# Patient Record
Sex: FEMALE
[De-identification: names, ages, dates, MRNs, and addresses within clinical notes are randomized per-mention and may not be internally consistent; named-entity substitution may affect disease eponyms.]

---

## 2018-06-26 NOTE — USB
Reason for exam: clinical finding.



History:

Implant Removal of both breasts, February 27, 2018.  Saline implants in both 

breasts, 2016.  Silicone gel implants in both breasts, 1990.

Indicated problem(s): palpable abnormality, breast implant problem, lump or 

thickening, and large axillary lymph nodes in both breasts.  Pain in the left 

breast.



Physical Findings:

Nurse Summary: area thick, multiple prominent nodular lymph nodes (nurse dw)>



US Breast LT

Left complete breast ultrasound includes all four quadrants, the retroareolar 

region and axilla. Finding demonstrates a 1.0 x 1.1 x 0.5cm oval, solid, 

hypoechoic, vascular lesion at 2 o'clock not previously identified, a 0.7 x 0.6 x 

0.3cm oval, solid, hypoechoic lesion at 3 o'clock not previously identified and a 

1.6 x 1.6 x 1.7cm round, cystic lesion at the axilla stable from 11/30/17, largest

of multiple cystic areas.



Right limited breast ultrasound including focal area of concern, retroareolar and 

axilla demonstrates a 3.4cm hyperechoic probable silicone at 2 o'clock.



These results were verbally communicated with the patient on 6/26/18.





ASSESSMENT: Suspicious, BI-RAD 4



RECOMMENDATION:

Surgical consultation of the left breast.

Manage patient on a clinical basis.



Called with mammographic findings and has scheduled an appointment for the patient

for 7/5/18 at 8:00 with Dr. Muse.



PRELIMINARY REPORT CALLED AND FAXED TO DR. MUSE ON 6/26/18.

## 2018-07-20 ENCOUNTER — HOSPITAL ENCOUNTER (OUTPATIENT)
Dept: HOSPITAL 47 - WWCWWP | Age: 58
End: 2018-07-20
Payer: COMMERCIAL

## 2018-07-20 VITALS — SYSTOLIC BLOOD PRESSURE: 130 MMHG | DIASTOLIC BLOOD PRESSURE: 75 MMHG | HEART RATE: 50 BPM | TEMPERATURE: 97.5 F

## 2018-07-20 VITALS — BODY MASS INDEX: 19.1 KG/M2

## 2018-07-20 DIAGNOSIS — Z53.9: Primary | ICD-10-CM

## 2018-07-20 NOTE — P.GSHP
History of Present Illness


H&P Date: 07/20/18





 Patient is a 58 year old white female who had bilateral silicone breast 

implants placed in 1990.  She did well for many years and then in approximately 

2007 she did a back flip on a trampoline and noted that the implant in the left 

breast ruptured.  She has been followed by a general/plastic surgeon in [place] 

and wishes to return to her for any treatment.  She recently noticed swelling 

and erythema in her left axilla and was concerned that this may be an emergent 

situation.  She underwent a surgical procedure in February 2018 at which time 

left axillary lymph node and scar tissue was debulked.  Operative report is 

reviewed although pathology is not available.  The patient came back to 

[place] and noted that she had some erythema and swelling in the left axilla 

she was seen by her primary care doctor and started on antibiotics.  She also 

had an ultrasound performed 6818.  The ultrasound of the left breast revealed 

a 1.1 cm solid vascular lesion at 2:00 not previously seen, a 0.7 cm lesion at 3

:00 not previously seen, and a 1.6 cm cystic lesion in the axilla.  In the 

right breast there was a 3.4 cm hypoechoic probable silicone area noted at 2:

00.  The patient was not willing to give any further history and continues to 

state that she wanted to go back to [place]. 


 


Physical examination: Examination of the left breast and axilla was done in a 

limited fashion as the patient wanted to be sure that there was no acute 

process ongoing


No acute infection was identified


Nodularity was noted in the left axillary area


Scar is noted related to prior breast surgery


Impression/plan:


1.  58-year-old white female with ultrasound abnormality noted in the left 

breast


2.  Extensive history related to prior left breast ruptured implant


3.  Patient wishes to return to [place] for treatment related to the left breast


At this time the patient does not appear to have an acute inflammatory or 

infectious process in the left axilla or left breast.  She does have a 

radiographic abnormality on an ultrasound performed 6818  which is being 

reviewed with radiology it was considered a BIRADS 4 lesion, for which biopsy 

may be recommended.  She understands this.   At this time she states that she 

does not want any further intervention here and wishes to return to [place].


cc: Dr. Muse, Dr. Cindy Salcido [facility], 

[place], [place], [address]








Past Medical History


Past Medical History: Thyroid Disorder


History of Any Multi-Drug Resistant Organisms: None Reported


Past Surgical History: Breast Surgery


Additional Past Surgical History / Comment(s): silicone implants in the 90's, 

patient stated that she's had multiple breast surgeries


Smoking Status: Former smoker


Additional Past Alcohol Use History / Comment(s): quit smoking 2006





Medications and Allergies


 Home Medications











 Medication  Instructions  Recorded  Confirmed  Type


 


Cholecalciferol [Vitamin D3] 1,000 unit PO DAILY 07/20/18 07/20/18 History


 


Levothyroxine Sodium 25 mcg PO QAM 07/20/18 07/20/18 History


 


Multivitamins, Thera [Multivitamin 1 tab PO DAILY 07/20/18 07/20/18 History





(formulary)]    


 


Zolpidem [Ambien] 10 mg PO HS PRN 07/20/18 07/20/18 History











 Allergies











Allergy/AdvReac Type Severity Reaction Status Date / Time


 


No Known Allergies Allergy   Unverified 07/20/18 12:02














Surgical - Exam


 Vital Signs











Temp Pulse BP Pulse Ox


 


 97.5 F L  50 L  130/75   98 


 


 07/20/18 11:52  07/20/18 11:52  07/20/18 11:52  07/20/18 11:52

## 2018-07-20 NOTE — P.PN
Progress Note - Text


Progress Note Date: 07/20/18





Patient's ultrasound was reviewed with DR. Gonzalez from radiology.  The 

recommendation is that the patient receive a mammogram and then at minimum a 

core biopsy of the lesion at 2:00 in the left breast.  This information will be 

relayed to the patient and Dr. Sosa.


cc: Dr. Sosa

## 2018-07-20 NOTE — P.PN
Progress Note - Text


Progress Note Date: 07/20/18





Information reguarding the patient's need for a mammogram and recommendation of 

ultrasound core biopsy was discussed with Dr. Muse.  Dr. Muse relates 

that the patient had an mammogram performed in [place] in August of last year, 

however we do not have that report.  We have attempted to contact the patient 

and will continue to do so.

## 2018-07-26 NOTE — P.PN
Progress Note - Text


Progress Note Date: 07/26/18





Dear Kenisha,


We understand the you have decided to transfer your  breast care to [place].  

We also understand that you have been provided with your medical records.  

Please understand that with your recent ultrasound abnormalities it has been 

recommended that a biopsy be considered.  If we can be of any further 

assistance please do not hesitate to contact us.


    Best regards, Dr. Jenna Sifuentes


Cc: Dr. Muse

## 2018-08-16 ENCOUNTER — HOSPITAL ENCOUNTER (OUTPATIENT)
Dept: HOSPITAL 47 - RADMAMWWP | Age: 58
Discharge: HOME | End: 2018-08-16
Payer: COMMERCIAL

## 2018-08-16 DIAGNOSIS — N64.4: ICD-10-CM

## 2018-08-16 DIAGNOSIS — N63.20: Primary | ICD-10-CM

## 2018-08-16 DIAGNOSIS — R92.8: ICD-10-CM

## 2018-08-16 PROCEDURE — 77065 DX MAMMO INCL CAD UNI: CPT

## 2018-08-16 NOTE — USB
Reason for exam: additional evaluation requested from abnormal screening.



History:

Patient is postmenopausal.

Implant Removal of both breasts, February 27, 2018.  Saline implants in both 

breasts, 2016.  Silicone gel implants in both breasts, 1990.



US Breast Limited LT

Left limited breast ultrasound including focal area of concern, retroareolar and 

axilla demonstrates a 1.0 x 0.4 x 1.2 cm oval solid lesion at 2 o'clock, a 0.7 x 

0.3 x 0.8 cm oval solid lesion at 3 oclock, which a 6 month follow up ultrasound 

is recommended.  A 1.7 x 1.6 cm round cystic lesion in the axilla that is thought 

to be an abnormal node. A 0.9 x 0.9 x 0.9 cm cystic lesion at 10 o'clock and a 

second 0.8 x 0.7 x 0.7 cm cystic lesion at 10 o'clock that could be silicone and a

6 month follow up  ultrasound is recommended for these.



These results were verbally communicated with the patient and result sheet given 

to the patient on 8/16/18.





ASSESSMENT: Suspicious, BI-RAD 4



RECOMMENDATION:

Ultrasound core biopsy of the left breast.





Called Dr. Sosa with mammographic findings and has scheduled an appointment for 

the patient for 8/24/18 at 9:20 with Dr Kyle.



PRELIMINARY REPORT CALLED AND FAXED TO DR. KYLE ON 8/16/18.

## 2018-08-16 NOTE — MM
Reason for exam: additional evaluation requested from prior study.



History:

Patient is postmenopausal.

Implant Removal of both breasts, February 27, 2018.  Saline implants in both 

breasts, 2016.  Silicone gel implants in both breasts, 1990.



Physical Findings:

Nurse Summary: A 0.5 cm lesion in the right axilla.



MG Diag Mamm Implant LT w CAD

CC and MLO view(s) were taken of the left breast.

Prior study comparison: June 8, 2018, left breast US breast LT.  November 30, 2017, left breast ultrasound, performed at [place].

The breast tissue is extremely dense which could obscure a lesion on mammography.

There is a persistant Left upper inner quadrant mass with associated 

calcifications.  Implant displaced views could not be performed due to patient 

pain.





ASSESSMENT: Incomplete: need additional imaging evaluation, BI-RAD 0



RECOMMENDATION:

Ultrasound of the left breast.

## 2018-08-24 ENCOUNTER — HOSPITAL ENCOUNTER (OUTPATIENT)
Dept: HOSPITAL 47 - WWCWWP | Age: 58
Discharge: HOME | End: 2018-08-24
Attending: SURGERY
Payer: COMMERCIAL

## 2018-08-24 VITALS
TEMPERATURE: 97.3 F | RESPIRATION RATE: 12 BRPM | SYSTOLIC BLOOD PRESSURE: 120 MMHG | DIASTOLIC BLOOD PRESSURE: 75 MMHG | HEART RATE: 51 BPM

## 2018-08-24 VITALS — BODY MASS INDEX: 19.6 KG/M2

## 2018-08-24 DIAGNOSIS — Z53.9: Primary | ICD-10-CM

## 2018-08-24 NOTE — P.GSHP
History of Present Illness


H&P Date: 08/24/18





Patient is a 58 year old white female status post 1991 had bilateral silicone 

breast implants.  In 2006 she jumped on a trampoline, she did a back flip and 

landed on the left breast implant and feels like it ruptured at that point in 

time.  She subsequently saw a Dr. in [place], Dr. Green, a plastic surgeon who 

left the right implant in place but removed the left implant without replacing 

it and the patient had a drain for 3 months and multiple areas of silicone that 

were removed. She than had an implant exchange in 2007 on the left, and 

replaced the right side also. She than had some further cosmetic surgery done 

and was referred to a reconstructive surgeon.  She saw Dr. Kellogg, she had a 

bilateral implant exchange again in 2013.  She again had complaints and was 

seen by Dr. Mitchell in [place].  She had the implants exchanged for a fourth time

, in 2016.  The patient had a bilateral mammogram in [place] in November 2017.  

At that time multiple foci of what appeared to be free silicone in silicone 

granulomas were noted in the afferent left axilla.  No no was made of concern 

about the right breast.  She will be due for a right breast mammogram in 

November 2018.  In February of 2018 she noted pain in the left axilla, and had 

surgery a node removed as well as silicone, and scar tissue.  Pathology was 

negative for cancer.


   She most recently was concerned about infection in the left axilla.  At this 

time the patient states that she was started on antibiotics and that the 

swelling and redness have resolved and she is doing well.  At the time however 

she did have an ultrasound of the left breast as well as a left breast 

mammogram at our institution.  The left breast mammogram recommended an 

ultrasound and the ultrasound revealed a 0.2 cm solid lesion at 2:00, a 0.7 cm 

lesion at 3:00 and a six-month follow-up ultrasound was recommended.  

Additionally a 1.7 cm lesion was noted in the axilla that was thought to be an 

abnormal node.  A 0.9 cystic lesion was seen at 10:00 and a second 0.8 cm 

cystic lesion at 10:00 which is thought to be silicone and six-month follow-up 

was recommended for these.  The area of greatest concern was the axillary 

lesion and ultrasound core biopsy/aspiration of the left breast axillary lesion 

was recommended after reviewing the radiographs with Dr. Delatorre.  The patient 

states that the area of concern in the left axilla has decreased in size and is 

not bothering her at this time.  It is still noticeable to her but much 

diminished.


The patient drinks coffee approximately 2 cups per day, is not drink soda.  She 

does not eat much chocolate.  She is not a smoker.





Family History:


1. father: pancreatic





Hormonal:


menarche: 11


pregnancy: none, tubes blocked


menopause: late 40's


BCP: none


hormones: none





Past Surgical History:


1. rhinoplasty


2.  multiple breast surgries


3. bone shaved on her legs right leg immobilize for three months





Past Medical History:


1. hypothyroid





Social:


smoke: none


Alcohol: Negative


Drugs: Negative




















- Constitutional


Constitutional: Denies chills, Denies fever





- EENT


Eyes: denies blurred vision, denies pain


Ears: deny: decreased hearing, tinnitus


Ears, nose, mouth and throat: Denies headache, Denies sore throat





- Breasts


Breasts: bilateral: as per HPI





- Cardiovascular


Cardiovascular: Denies chest pain, Denies shortness of breath





- Respiratory


Respiratory: Denies cough, Denies 7





- Gastrointestinal


Gastrointestinal: Denies abdominal pain, Denies diarrhea, Denies nausea, Denies 

vomiting





- Genitourinary (Female)


Genitourinary: Denies dysuria, Denies hematuria





- Menstruation


Menstruation: Reports postmenopausal





- Musculoskeletal


Musculoskeletal: Denies myalgias





- Integumentary


Integumentary: Denies pruritus, Denies rash





- Neurological


Neurological: Denies numbness, Denies weakness





- Psychiatric


Psychiatric: Denies anxiety, Denies depression





- Endocrine


Comment: 





hypothyroid





- Hematologic/Lymphatic


Comment: 





none





- Allergic/Immunologic


Comment: 





none





Past Medical History


Past Medical History: Thyroid Disorder


History of Any Multi-Drug Resistant Organisms: None Reported


Past Surgical History: Breast Surgery


Additional Past Surgical History / Comment(s): silicone implants in the 90's, 

patient stated that she's had multiple breast surgeries


Smoking Status: Former smoker


Additional Past Alcohol Use History / Comment(s): quit smoking 2006





Medications and Allergies


 Home Medications











 Medication  Instructions  Recorded  Confirmed  Type


 


Cholecalciferol [Vitamin D3] 1,000 unit PO DAILY 07/20/18 07/20/18 History


 


Levothyroxine Sodium 25 mcg PO QAM 07/20/18 07/20/18 History


 


Multivitamins, Thera [Multivitamin 1 tab PO DAILY 07/20/18 07/20/18 History





(formulary)]    


 


Zolpidem [Ambien] 10 mg PO HS PRN 07/20/18 07/20/18 History











 Allergies











Allergy/AdvReac Type Severity Reaction Status Date / Time


 


No Known Allergies Allergy   Unverified 08/21/18 10:01














Surgical - Exam





- General


well developed, well nourished, no distress





- Eyes


normal ocular movement, no icteric





- ENT


no hearing loss, no congestion





- Neck


no masses, trachea midline





- Respiratory


normal respiratory effort, clear to auscultation





- Cardiovascular


Rhythm: regular


Heart Sounds: normal: S1, S2





- Abdomen


Abdomen: soft, non tender, no guarding, no rigid, no rebound





- Neurologic


no disoriented, no combative





- Musculoskeletal


normal gait, normal posture





- Psychiatric


oriented to time, oriented to person, oriented to place, speech is normal, 

memory intact





Breast examination:


Right breast: Multiple scars from prior breast implant placement multiple 

positional exam no dominant masses or nodules of concern


Right axilla: No adenopathy of concern


Left breast: Multiple scars from prior surgeries some increased area of 

nodularity at approximately the 9:00 axillary position which is in close 

proximity facility to the implant


Left axilla: Questionable shotty adenopathy of concern no enlarged nodes which 

are worrisome





Results





Mammogram and ultrasound reviewed





Assessment and Plan


Assessment: 





Impression/plan:


1.  Multiple prior breast biopsies related to implant placement


2.  Ruptured implant left breast which has been removed and replaced; patient 

has had silicone granulomas removed from the left breast area


3.  Hypothyroidism


4.  abnormal ultrasound of the left breast


5. palpable area of the left lateral axillary area may correspond to that which 

is seen on ultrasound


Plan:


1.  Ultrasound-guided aspiration/biopsy of lesion of concern in the left axilla


2.  Confirm that the area that is palpable is consistent with that which is 

seen on ultrasound if not this may require needle biopsy as well


3.  Medical management of medical problems


We have had a long discussion regarding the risks and benefits of needle biopsy/

aspiration of the area of concern on the ultrasound as well as that which is 

palpable.  The patient understands the risks and benefits.  She has reviewed 

the ultrasound with us with Dr. Delatorre of radiology.  At this time we have 

discussed options of conservative management and waiting versus options of 

needle biopsy and she wishes to proceed with needle biopsy.  We will use 

extreme caution to assure that the implants are not damaged, however we have 

discussed this risk as well.  The patient understands the risks and benefits 

this will be scheduled in the near future.


Cc: Dr. Muse

## 2018-09-10 ENCOUNTER — HOSPITAL ENCOUNTER (OUTPATIENT)
Dept: HOSPITAL 47 - RADPROMAIN | Age: 58
Discharge: HOME | End: 2018-09-10
Payer: COMMERCIAL

## 2018-09-10 VITALS — RESPIRATION RATE: 16 BRPM | TEMPERATURE: 97.4 F

## 2018-09-10 VITALS — DIASTOLIC BLOOD PRESSURE: 72 MMHG | SYSTOLIC BLOOD PRESSURE: 138 MMHG | HEART RATE: 68 BPM

## 2018-09-10 DIAGNOSIS — M79.89: Primary | ICD-10-CM

## 2018-09-10 PROCEDURE — 76942 ECHO GUIDE FOR BIOPSY: CPT

## 2018-09-10 PROCEDURE — 88305 TISSUE EXAM BY PATHOLOGIST: CPT

## 2018-09-10 PROCEDURE — 20206 BIOPSY MUSCLE PERQ NEEDLE: CPT

## 2018-09-10 NOTE — US
EXAMINATION TYPE: US biopsy soft tissue/muscle, fine-needle aspiration

 

DATE OF EXAM: 9/10/2018

 

HISTORY: Left axillary mass.

 

FINDINGS: Maximal barrier technique was utilized.  The skin overlying a 
suitable path to the patient's mass was localized with ultrasound and the 
overlying skin prepped and draped.  Ultrasound was utilized with sterile 
technique. Lidocaine was  used for local anesthesia. 21-gauge needle was 
advanced under ultrasound guidance, minimal clear, gel-like material was 
aspirated. A skin nick was made with a scalpel.  An 18-gauge needle was 
advanced under direct ultrasound guidance and core specimen obtained of the 
mass.  Specimen submitted in formalin to Pathology.  Following the procedure, 
hemostasis achieved and the patient is discharged in stable condition without 
complication.

 

IMPRESSION:STATUS POST ULTRASOUND GUIDED CORE BIOPSY OF left axillary MASS, 
PATHOLOGY IS PENDING.  THIS PROCEDURE IS PERFORMED BY THE UNDERSIGNED.   

 

Pathology Results: Benign



LEFT AXILLA, CORE BIOPSY: Benign fibroadipose tissue with fat necrosis. See 
note. 



Recommendation

Follow up ultrasound in 6 months.
LICHA

## 2018-10-19 ENCOUNTER — HOSPITAL ENCOUNTER (OUTPATIENT)
Dept: HOSPITAL 47 - WWCWWP | Age: 58
Discharge: HOME | End: 2018-10-19
Attending: SURGERY
Payer: COMMERCIAL

## 2018-10-19 VITALS
DIASTOLIC BLOOD PRESSURE: 73 MMHG | HEART RATE: 56 BPM | TEMPERATURE: 97.8 F | SYSTOLIC BLOOD PRESSURE: 123 MMHG | RESPIRATION RATE: 14 BRPM

## 2018-10-19 VITALS — BODY MASS INDEX: 19.1 KG/M2

## 2018-10-19 DIAGNOSIS — Z53.9: Primary | ICD-10-CM

## 2018-10-19 NOTE — P.PN
Subjective


Progress Note Date: 10/19/18


Principal diagnosis: 





Patient is status post ultrasound-guided core biopsy of left axillary palpable/

radiographic abnormality at approximately the 3-4 o'clock position.  The 

pathologic findings were consistent with benign fibroadipose adipose tissue 

with fat necrosis.  The biopsy was felt to be scanty and may not completely.  

Representative of the lesions seen and clinical/imaging study of correlation 

was recommended.  Her radiographs were reviewed with the radiologist with the 

pathology findings as well.  The area of concern is noted to be stable over her 

radiographs done in 2017 and with this in mind it was felt that this could be 

managed conservatively with repeat left breast mammogram and ultrasound in 6 

months.  Of some concern is the fact that there is some palpable change in the 

lateral aspect of the breast tissue/axillary area.  It is believed that this is 

the area which was sampled however I discussed with the patient that I cannot 

with certainty confirm this.  The patient states that the area of nodularity 

has been present for approximately 6 months without change.  The patient has 

been given the option of an open biopsy of this area however at this time 

wishes to return to [place] where her plastic surgeon is as she wishes to have 

evaluation with her.  The patient is not complaining of any pain in her breast 

at this time.  The patient is not complaining of any evidence of any infection 

in her breast.





Objective





- Vital Signs


Vital signs: 


 Vital Signs











Temp  97.8 F   10/19/18 08:45


 


Pulse  56 L  10/19/18 08:45


 


Resp  14   10/19/18 08:45


 


BP  123/73   10/19/18 08:45


 


Pulse Ox  99   10/19/18 08:45








 Intake & Output











 10/18/18 10/19/18 10/19/18





 18:59 06:59 18:59


 


Weight   48.988 kg














- Constitutional


General appearance: Present: thin





- EENT


Eyes: Present: EOMI


ENT: Present: hearing grossly normal





- Respiratory


Respiratory: bilateral: CTA





- Cardiovascular


Rhythm: regular


Heart sounds: normal: S1, S2





- Gastrointestinal


General gastrointestinal: Present: soft





- Musculoskeletal


Musculoskeletal: Present: gait normal





- Psychiatric


Psychiatric: Present: A&O x's 3, appropriate affect, intact judgment & insight





- Additional findings


Additional findings: 





Left breast examination: The patient left breast examination has well-healed 

scars from prior implant placement and replacement


No dominant masses or nodules of concern in the left breast


In the 3 o'clock position the patient has an area of slight nodularity which is 

approximately 6-8 mm in size


This is the area which is believed to have been biopsied by the radiologist


No adenopathy of concern in the axilla





Assessment and Plan


Assessment: 





Impression:


1.  Patient status post ultrasound core biopsy of the left axillary region/

benign fibroadipose tissue with fat necrosis


2.  Small palpable abnormality left axillary area believed to be consistent 

with the area which was biopsied this is approximately 8 mm in size


3.  Multiple prior breast biopsies related to implant placement


4.  Ruptured implant left breast which has been removed and replaced patient 

has had silicone granulomas removed from the left breast area


5.  No evidence of any infection in the breast at this time


Plan:


The patient has been given the option of surgical resection of the palpable 

abnormality in the axillary area of the left breast.  The patient at this time 

wishes to avoid surgery and is planning to follow up with her plastic surgeon 

in [place].  She understands I cannot guarantee that this is not a malignant 

process without resectional biopsy however she wishes to avoid this at the 

present time.


1.  Appointment with plastic surgeon in [place] this is to be made by the 

patient


2.  Repeat visit and exam here in 2 months with repeat mammogram and ultrasound 

of the left breast in 6 months


3.  The patient will call immediately if she has any changes or questions of 

concern


Cc: Dr. Muse